# Patient Record
(demographics unavailable — no encounter records)

---

## 2025-03-13 NOTE — ASSESSMENT
[FreeTextEntry1] : Kenny is a 9y 10m male with early pubertal development. Given the degree of puberal development, it is likely that his puberty started prior to the age of 9 years. Central precocious puberty (CPP) in boys is characterized by the early activation of the hypothalamic-pituitary-gonadal axis, leading to the development of secondary sexual characteristics before the age of 9 years. Boys with CPP often present with increased testicular volume, which is typically the first sign of puberty, and may also exhibit penile enlargement, pubic hair, and accelerated growth velocity. Unlike girls, CPP in boys is more frequently associated with underlying central nervous system abnormalities, such as congenital defects, tumors, trauma, or infections.  Plan - Obtain early morning blood work and contact me 1-2 weeks later to discuss results.  - Obtain MRI of the brain.  - Will consider bone age xray at next visit.  - We discussed normal pubertal development and the timing of puberty in boys.  - We discussed the rationale behind the treatment of children with CPP.  - Continue to monitor growth and development.  - Follow up in 3 months.   Charley Greenfield MD Pediatric Endocrinology [address], [address] [place] [address] [phone]

## 2025-03-13 NOTE — PAST MEDICAL HISTORY
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

## 2025-03-13 NOTE — CONSULT LETTER
[Dear  ___] : Dear  [unfilled], [Consult Letter:] : I had the pleasure of evaluating your patient, [unfilled]. [Please see my note below.] : Please see my note below. [Consult Closing:] : Thank you very much for allowing me to participate in the care of this patient.  If you have any questions, please do not hesitate to contact me. [Sincerely,] : Sincerely, [FreeTextEntry3] : Charley Greenfield MD Pediatric Endocrinologist

## 2025-03-13 NOTE — PHYSICAL EXAM
[Healthy Appearing] : healthy appearing [Well Nourished] : well nourished [Interactive] : interactive [Overweight] : overweight [Microcephaly] : on exam the head was microcephalic [Normal Appearance] : normal appearance [Well formed] : well formed [Normally Set] : normally set [Normal S1 and S2] : normal S1 and S2 [Murmur] : no murmurs [Clear to Ausculation Bilaterally] : clear to auscultation bilaterally [Abdomen Soft] : soft [Abdomen Tenderness] : non-tender [] : no hepatosplenomegaly [2] : was Elder stage 2 [Scant] : scant [Testes] : normal [___] : [unfilled] [Normal] : normal

## 2025-03-13 NOTE — ASSESSMENT
[FreeTextEntry1] : Kenny is a 9y 10m male with early pubertal development. Given the degree of puberal development, it is likely that his puberty started prior to the age of 9 years. Central precocious puberty (CPP) in boys is characterized by the early activation of the hypothalamic-pituitary-gonadal axis, leading to the development of secondary sexual characteristics before the age of 9 years. Boys with CPP often present with increased testicular volume, which is typically the first sign of puberty, and may also exhibit penile enlargement, pubic hair, and accelerated growth velocity. Unlike girls, CPP in boys is more frequently associated with underlying central nervous system abnormalities, such as congenital defects, tumors, trauma, or infections.  Plan - Obtain early morning blood work and contact me 1-2 weeks later to discuss results.  - Obtain MRI of the brain.  - Will consider bone age xray at next visit.  - We discussed normal pubertal development and the timing of puberty in boys.  - We discussed the rationale behind the treatment of children with CPP.  - Continue to monitor growth and development.  - Follow up in 3 months.   Charley Greenfield MD Pediatric Endocrinology [address], [address] [place] [address] [phone]

## 2025-03-13 NOTE — PAST MEDICAL HISTORY
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